# Patient Record
Sex: MALE | Race: WHITE | NOT HISPANIC OR LATINO | URBAN - METROPOLITAN AREA
[De-identification: names, ages, dates, MRNs, and addresses within clinical notes are randomized per-mention and may not be internally consistent; named-entity substitution may affect disease eponyms.]

---

## 2019-01-11 ENCOUNTER — OFFICE VISIT CONVERTED (OUTPATIENT)
Dept: ORTHOPEDIC SURGERY | Facility: CLINIC | Age: 34
End: 2019-01-11
Attending: ORTHOPAEDIC SURGERY

## 2019-10-25 ENCOUNTER — HOSPITAL ENCOUNTER (OUTPATIENT)
Dept: GENERAL RADIOLOGY | Facility: HOSPITAL | Age: 34
Discharge: HOME | End: 2019-10-25
Attending: NURSE PRACTITIONER

## 2021-04-18 ENCOUNTER — HOSPITAL ENCOUNTER (OUTPATIENT)
Dept: URGENT CARE | Facility: CLINIC | Age: 36
Discharge: HOME | End: 2021-04-18
Attending: PHYSICIAN ASSISTANT

## 2021-05-11 NOTE — H&P
"   History and Physical      Patient Name: Rod Winn   Patient ID: [id number]   Sex: Male   YOB: 1985    Referring Provider: Miguel Davis MD    Visit Date: January 11, 2019    Provider: Miugel Davis MD   Location: [facility]   Location Address: [address] [place] [address]  [id number]   Location Phone: [phone]          Chief Complaint  · Left Shoulder Pain      History Of Present Illness  Rod Winn is a 33 year old /White male who presents today to [facility] for evaluation of left shoulder pain. He states it started in August when he was doing an event, feeling a pull in his left shoulder. He states he had some mild pain prior to that incident. He states he has had pain lifting up his child and pain with overhead activity. He states pain starts in the posterior aspect of the shoulder with pain in the anterior portion in the shoulder and pain in the mid biceps. Patient had x-rays, which were reviewed, revealing AC joint osteoarthritis; negative for fracture or dislocation. Patient also had an MRI.       Allergy List  NO KNOWN DRUG ALLERGIES         Social History  Tobacco (Never)         Review of Systems  · Constitutional  o Denies  o : fever, chills, weight loss  · Cardiovascular  o Denies  o : chest pain, shortness of breath  · Gastrointestinal  o Denies  o : liver disease, heartburn, nausea, blood in stools  · Genitourinary  o Denies  o : painful urination, blood in urine  · Integument  o Denies  o : rash, itching  · Neurologic  o Denies  o : headache, weakness, loss of consciousness  · Musculoskeletal  o Denies  o : painful, swollen joints  · Psychiatric  o Denies  o : drug/alcohol addiction, anxiety, depression      Vitals  Date Time BP Position Site L\R Cuff Size HR RR TEMP(F) WT  HT  BMI kg/m2 BSA m2 O2 Sat HC       01/11/2019 12:59 PM         160lbs 0oz 5'  10\" 22.96 1.89            Physical Examination  · Constitutional  o Appearance  o : well " developed, well-nourished, no obvious deformities present  · Head and Face  o Head  o :   § Inspection  § : normocephalic  o Face  o :   § Inspection  § : no facial lesions  · Eyes  o Conjunctivae  o : conjunctivae normal  o Sclerae  o : sclerae white  · Ears, Nose, Mouth and Throat  o Ears  o :   § External Ears  § : appearance within normal limits  § Hearing  § : intact  o Nose  o :   § External Nose  § : appearance normal  · Neck  o Inspection/Palpation  o : normal appearance  o Range of Motion  o : full range of motion  · Respiratory  o Respiratory Effort  o : breathing unlabored  o Inspection of Chest  o : normal appearance  o Auscultation of Lungs  o : no audible wheezing or rales  · Cardiovascular  o Heart  o : regular rate  · Gastrointestinal  o Abdominal Examination  o : soft and non-tender  · Skin and Subcutaneous Tissue  o General Inspection  o : intact, no rashes  · Psychiatric  o General  o : Alert and oriented x3  o Judgement and Insight  o : judgment and insight intact  o Mood and Affect  o : mood normal, affect appropriate  · Left Shoulder  o Inspection  o : No skin discoloration, atrophy, or swelling. Palpable tenderness over the AC joint and SC joint. Forward flexion 160. Abduction 140. IR to L4. Pain with empty can. Pain with cross-body adduction. Neurovascularly grossly intact. Sensation grossly intact. 2+ radial and ulnar pulses.  · Injection Note/Aspiration Note  o Site  o : left shoulder   o Procedure  o : After educating the patient, patient gave consent for procedure. After using Chloraprep, the joint space was injected. The patient tolerated the procedure well.  o Medication  o : 80 mg of DepoMedrol with 9 cc of 1% Lidocaine  · Imaging  o Imaging  o : MRI performed at [facility] on 10/04/2018 reveals: 1) Small partial tear of the supraspinatus tendon seen at the attachment site; 2) Extrinsic shoulder impingement by an inferior sloping acromion process is accompanied by  signs of tendinosis of the supraspinatus tendon; 3) Type 4 acromion; 4) No os acromiale.           Assessment  · Primary osteoarthritis AC joint, left     716.91/M19.019  · Left shoulder pain, unspecified chronicity     719.41/M25.512  · Incomplete tear of left rotator cuff     840.4/M75.112  · Impingement syndrome of shoulder, left     726.2/M75.42  · Pain of left sternoclavicular joint     719.41/M25.512      Plan  · Orders  o Depo-Medrol injection 80mg () - - 01/11/2019   Lot [id number] Exp 11 2020 [employer] Administered by TOMMIE Davis MD  o Shoulder Intra-articular Injection without US Guidance [facility] ([id number]) - - 01/11/2019   Lot [id number] Exp 08 01 2020 [employer]  · Instructions  o Reviewed the patient's Past Medical, Social, and Family history as well as the ROS at today's visit, no changes.  o Call or return if worsening symptoms.  o Reviewed Xrays.  o This note was transcribed by Gabi singleton/keshia.  o Physical therapy. Voltaren 75 mg. Left shoulder injection. Follow up in four weeks.            Electronically Signed by: Gabi Duval-, Other -Author on January 12, 2019 07:47:20 AM  Electronically Co-signed by: KATLYN Baker-YESSI -Reviewer on January 14, 2019 11:49:52 AM  Electronically Co-signed by: Miguel Davis MD -Reviewer on January 16, 2019 09:42:12 AM

## 2021-05-16 VITALS — BODY MASS INDEX: 22.9 KG/M2 | HEIGHT: 70 IN | WEIGHT: 160 LBS
